# Patient Record
Sex: FEMALE | Race: WHITE | URBAN - METROPOLITAN AREA
[De-identification: names, ages, dates, MRNs, and addresses within clinical notes are randomized per-mention and may not be internally consistent; named-entity substitution may affect disease eponyms.]

---

## 2024-01-03 ENCOUNTER — APPOINTMENT (OUTPATIENT)
Dept: GENERAL RADIOLOGY | Age: 44
End: 2024-01-03

## 2024-01-03 ENCOUNTER — APPOINTMENT (OUTPATIENT)
Dept: CT IMAGING | Age: 44
End: 2024-01-03

## 2024-01-03 ENCOUNTER — HOSPITAL ENCOUNTER (EMERGENCY)
Age: 44
Discharge: HOME | End: 2024-01-03
Attending: EMERGENCY MEDICINE

## 2024-01-03 VITALS
TEMPERATURE: 98 F | SYSTOLIC BLOOD PRESSURE: 142 MMHG | RESPIRATION RATE: 18 BRPM | OXYGEN SATURATION: 99 % | DIASTOLIC BLOOD PRESSURE: 70 MMHG | HEART RATE: 79 BPM

## 2024-01-03 DIAGNOSIS — R42 DIZZINESS: Primary | ICD-10-CM

## 2024-01-03 LAB
ALBUMIN SERPL-MCNC: 4.4 G/DL (ref 3.4–5)
ALP SERPL-CCNC: 71 U/L (ref 40–129)
ALT SERPL-CCNC: 31 U/L (ref 10–40)
ANION GAP SERPL CALCULATED.3IONS-SCNC: 14 MMOL/L (ref 3–16)
AST SERPL-CCNC: 24 U/L (ref 15–37)
BACTERIA URNS QL MICRO: ABNORMAL /HPF
BASOPHILS # BLD: 0.1 K/UL (ref 0–0.2)
BASOPHILS NFR BLD: 1.3 %
BILIRUB DIRECT SERPL-MCNC: <0.2 MG/DL (ref 0–0.3)
BILIRUB INDIRECT SERPL-MCNC: NORMAL MG/DL (ref 0–1)
BILIRUB SERPL-MCNC: 0.3 MG/DL (ref 0–1)
BILIRUB UR QL STRIP.AUTO: NEGATIVE
BUN SERPL-MCNC: 10 MG/DL (ref 7–20)
CALCIUM SERPL-MCNC: 9.7 MG/DL (ref 8.3–10.6)
CHLORIDE SERPL-SCNC: 106 MMOL/L (ref 99–110)
CLARITY UR: CLEAR
CO2 SERPL-SCNC: 20 MMOL/L (ref 21–32)
COLOR UR: YELLOW
CREAT SERPL-MCNC: 0.8 MG/DL (ref 0.6–1.1)
DEPRECATED RDW RBC AUTO: 13.7 % (ref 12.4–15.4)
EKG ATRIAL RATE: 82 BPM
EKG DIAGNOSIS: NORMAL
EKG P AXIS: 31 DEGREES
EKG P-R INTERVAL: 154 MS
EKG Q-T INTERVAL: 388 MS
EKG QRS DURATION: 86 MS
EKG QTC CALCULATION (BAZETT): 453 MS
EKG R AXIS: 24 DEGREES
EKG T AXIS: 4 DEGREES
EKG VENTRICULAR RATE: 82 BPM
EOSINOPHIL # BLD: 0.2 K/UL (ref 0–0.6)
EOSINOPHIL NFR BLD: 3.7 %
EPI CELLS #/AREA URNS HPF: ABNORMAL /HPF (ref 0–5)
FLUAV RNA RESP QL NAA+PROBE: NOT DETECTED
FLUBV RNA RESP QL NAA+PROBE: NOT DETECTED
GFR SERPLBLD CREATININE-BSD FMLA CKD-EPI: >60 ML/MIN/{1.73_M2}
GLUCOSE SERPL-MCNC: 101 MG/DL (ref 70–99)
GLUCOSE UR STRIP.AUTO-MCNC: NEGATIVE MG/DL
HCG UR QL: NEGATIVE
HCT VFR BLD AUTO: 37.8 % (ref 36–48)
HGB BLD-MCNC: 12.7 G/DL (ref 12–16)
HGB UR QL STRIP.AUTO: ABNORMAL
HYALINE CASTS #/AREA URNS LPF: ABNORMAL /LPF (ref 0–2)
KETONES UR STRIP.AUTO-MCNC: NEGATIVE MG/DL
LEUKOCYTE ESTERASE UR QL STRIP.AUTO: NEGATIVE
LIPASE SERPL-CCNC: 43 U/L (ref 13–60)
LYMPHOCYTES # BLD: 2.1 K/UL (ref 1–5.1)
LYMPHOCYTES NFR BLD: 31.1 %
MCH RBC QN AUTO: 28.3 PG (ref 26–34)
MCHC RBC AUTO-ENTMCNC: 33.7 G/DL (ref 31–36)
MCV RBC AUTO: 84.1 FL (ref 80–100)
MONOCYTES # BLD: 0.4 K/UL (ref 0–1.3)
MONOCYTES NFR BLD: 5.6 %
MUCOUS THREADS #/AREA URNS LPF: ABNORMAL /LPF
NEUTROPHILS # BLD: 4 K/UL (ref 1.7–7.7)
NEUTROPHILS NFR BLD: 58.3 %
NITRITE UR QL STRIP.AUTO: NEGATIVE
NT-PROBNP SERPL-MCNC: 116 PG/ML (ref 0–124)
PH UR STRIP.AUTO: 6 [PH] (ref 5–8)
PLATELET # BLD AUTO: 348 K/UL (ref 135–450)
PMV BLD AUTO: 7.4 FL (ref 5–10.5)
POTASSIUM SERPL-SCNC: 4 MMOL/L (ref 3.5–5.1)
PROT SERPL-MCNC: 7.6 G/DL (ref 6.4–8.2)
PROT UR STRIP.AUTO-MCNC: NEGATIVE MG/DL
RBC # BLD AUTO: 4.5 M/UL (ref 4–5.2)
RBC #/AREA URNS HPF: ABNORMAL /HPF (ref 0–4)
SARS-COV-2 RNA RESP QL NAA+PROBE: NOT DETECTED
SODIUM SERPL-SCNC: 140 MMOL/L (ref 136–145)
SP GR UR STRIP.AUTO: 1.02 (ref 1–1.03)
TROPONIN, HIGH SENSITIVITY: <6 NG/L (ref 0–14)
UA COMPLETE W REFLEX CULTURE PNL UR: ABNORMAL
UA DIPSTICK W REFLEX MICRO PNL UR: YES
URN SPEC COLLECT METH UR: ABNORMAL
UROBILINOGEN UR STRIP-ACNC: 0.2 E.U./DL
WBC # BLD AUTO: 6.8 K/UL (ref 4–11)
WBC #/AREA URNS HPF: ABNORMAL /HPF (ref 0–5)

## 2024-01-03 PROCEDURE — 83690 ASSAY OF LIPASE: CPT

## 2024-01-03 PROCEDURE — 99285 EMERGENCY DEPT VISIT HI MDM: CPT

## 2024-01-03 PROCEDURE — 96374 THER/PROPH/DIAG INJ IV PUSH: CPT

## 2024-01-03 PROCEDURE — 80076 HEPATIC FUNCTION PANEL: CPT

## 2024-01-03 PROCEDURE — 2580000003 HC RX 258

## 2024-01-03 PROCEDURE — 83880 ASSAY OF NATRIURETIC PEPTIDE: CPT

## 2024-01-03 PROCEDURE — 85025 COMPLETE CBC W/AUTO DIFF WBC: CPT

## 2024-01-03 PROCEDURE — 71046 X-RAY EXAM CHEST 2 VIEWS: CPT

## 2024-01-03 PROCEDURE — 6360000002 HC RX W HCPCS

## 2024-01-03 PROCEDURE — 93005 ELECTROCARDIOGRAM TRACING: CPT

## 2024-01-03 PROCEDURE — 70450 CT HEAD/BRAIN W/O DYE: CPT

## 2024-01-03 PROCEDURE — 84703 CHORIONIC GONADOTROPIN ASSAY: CPT

## 2024-01-03 PROCEDURE — 87636 SARSCOV2 & INF A&B AMP PRB: CPT

## 2024-01-03 PROCEDURE — 80048 BASIC METABOLIC PNL TOTAL CA: CPT

## 2024-01-03 PROCEDURE — 84484 ASSAY OF TROPONIN QUANT: CPT

## 2024-01-03 PROCEDURE — 81001 URINALYSIS AUTO W/SCOPE: CPT

## 2024-01-03 RX ORDER — SODIUM CHLORIDE, SODIUM LACTATE, POTASSIUM CHLORIDE, AND CALCIUM CHLORIDE .6; .31; .03; .02 G/100ML; G/100ML; G/100ML; G/100ML
1000 INJECTION, SOLUTION INTRAVENOUS ONCE
Status: COMPLETED | OUTPATIENT
Start: 2024-01-03 | End: 2024-01-03

## 2024-01-03 RX ORDER — ONDANSETRON 4 MG/1
4 TABLET, ORALLY DISINTEGRATING ORAL 3 TIMES DAILY PRN
Qty: 21 TABLET | Refills: 0 | Status: SHIPPED | OUTPATIENT
Start: 2024-01-03

## 2024-01-03 RX ORDER — ONDANSETRON 2 MG/ML
4 INJECTION INTRAMUSCULAR; INTRAVENOUS EVERY 6 HOURS PRN
Status: DISCONTINUED | OUTPATIENT
Start: 2024-01-03 | End: 2024-01-03 | Stop reason: HOSPADM

## 2024-01-03 RX ADMIN — ONDANSETRON 4 MG: 2 INJECTION INTRAMUSCULAR; INTRAVENOUS at 18:00

## 2024-01-03 RX ADMIN — SODIUM CHLORIDE, POTASSIUM CHLORIDE, SODIUM LACTATE AND CALCIUM CHLORIDE 1000 ML: 600; 310; 30; 20 INJECTION, SOLUTION INTRAVENOUS at 18:03

## 2024-01-03 ASSESSMENT — ENCOUNTER SYMPTOMS
COUGH: 0
EYE PAIN: 0
DIARRHEA: 0
ABDOMINAL PAIN: 0
ALLERGIC/IMMUNOLOGIC NEGATIVE: 1
NAUSEA: 0
CONSTIPATION: 0
VOMITING: 0
WHEEZING: 0
PHOTOPHOBIA: 0
SHORTNESS OF BREATH: 0
CHEST TIGHTNESS: 0
ABDOMINAL DISTENTION: 0
BACK PAIN: 0

## 2024-01-03 ASSESSMENT — PAIN DESCRIPTION - DESCRIPTORS: DESCRIPTORS: DISCOMFORT

## 2024-01-03 ASSESSMENT — PAIN - FUNCTIONAL ASSESSMENT: PAIN_FUNCTIONAL_ASSESSMENT: 0-10

## 2024-01-03 ASSESSMENT — PAIN DESCRIPTION - LOCATION: LOCATION: HEAD

## 2024-01-03 ASSESSMENT — PAIN SCALES - GENERAL: PAINLEVEL_OUTOF10: 2

## 2024-01-03 NOTE — ED NOTES
Signed       Expand All Collapse [facility] Emergency Department  MEDICAL SCREENING EXAM     Date of Service: 1/3/2024     Reason for Visit: No chief complaint on file.           Patient History, Brief Exam, and Initial Assessment      Abbreviated HPI: Regine Schultz is a 43 y.o. female presenting with numbness and tingling to bilateral hands and feet as well as feeling lightheaded and dizzy that is been intermittent over the last 2 days.  Patient states she has had some intermittent left-sided upper abdominal pain and lower chest pain.  She denies any exertional pain.  Denies any shortness of breath.  Denies any productive cough or hemoptysis.  Denies any fevers.  Denies any slurred speech or facial droop.     INITIAL VITALS:  ,  ,  ,  ,       Patient is ambulatory in the emergency department that difficulty.  Alert and oriented   HEENT normocephalic atraumatic , no facial swelling, extraocular movements intact, no visual loss  Neck is supple  Lungs are clear to auscultation  Heart regular rate and rhythm  Abdomen positive bowel sounds all 4 quadrants soft  Extremities moving all extremities without edema or erythema  Neurological patient is alert and oriented, no acute focal deficits, no slurred speech or facial droop.  No pronator drift.  Moving all extremities.  No visual loss.  Stroke scale 0  5/5 strength of all extremities.     Plan      Patient was evaluated in the REU for a medical screening exam.  To further evaluate the presenting complaints, the following orders have been placed:      Orders Placed This Encounter   Procedures    COVID-19 & Influenza Combo    XR CHEST PORTABLE    CBC with Auto Differential    BMP w/ Reflex to MG    Hepatic Function Panel    Lipase    Troponin    BNP    Urine Preg (Lab)    Urinalysis with Reflex to Culture         See primary provider's note for full details and final disposition.      Current Facility-Administered Medications:     Encounter Medications 
primary provider's note for full details and final disposition.      Current Facility-Administered Medications:     Encounter Medications   No orders of the defined types were placed in this encounter.            REU Dispo      Stable for lobby while awaiting ED bed     Relevant Medical History      Past Medical History   No past medical history on file.     Past Surgical History   No past surgical history on file.     Not on File       Chela Wlide PA  01/03/24 1509                 Reason for Visit: No chief complaint on file.           Patient History, Brief Exam, and Initial Assessment      Abbreviated HPI: Regine Schultz is a 43 y.o. female presenting with numbness and tingling to bilateral hands and feet as well as feeling lightheaded and dizzy that is been intermittent over the last 2 days.  Patient states she has had some intermittent left-sided upper abdominal pain and lower chest pain.  She denies any exertional pain.  Denies any shortness of breath.  Denies any productive cough or hemoptysis.  Denies any fevers.  Denies any slurred speech or facial droop.     INITIAL VITALS:  ,  ,  ,  ,       Patient is ambulatory in the emergency department that difficulty.  Alert and oriented   HEENT normocephalic atraumatic , no facial swelling, extraocular movements intact, no visual loss  Neck is supple  Lungs are clear to auscultation  Heart regular rate and rhythm  Abdomen positive bowel sounds all 4 quadrants soft  Extremities moving all extremities without edema or erythema  Neurological patient is alert and oriented, no acute focal deficits, no slurred speech or facial droop.  No pronator drift.  Moving all extremities.  No visual loss.  Stroke scale 0  5/5 strength of all extremities.     Plan      Patient was evaluated in the REU for a medical screening exam.  To further evaluate the presenting complaints, the following orders have been placed:      Orders Placed This Encounter   Procedures    COVID-19 &

## 2024-01-03 NOTE — ED PROVIDER NOTES
ED Attending Attestation Note     Date of evaluation: 1/3/2024    This patient was seen by the advance practice provider.  I have seen and examined the patient, agree with the workup, evaluation, management and diagnosis. The care plan has been discussed.  I have reviewed the ECG and concur with the CALEB's interpretation.  My assessment reveals patient presents emergency department with numbness and tingling in her hands and feet as we will as feeling lightheaded and dizzy going on for 2 days now.  She is from out of town visiting family.  No other symptoms.  On my exam GCS 15.  No focal deficits.  NIH stroke scale performed which was 0 as documented below    NIH Stroke Scale  Interval: Baseline  Level of Consciousness (1a): Alert  LOC Questions (1b): Answers both correctly  LOC Commands (1c): Performs both tasks correctly  Best Gaze (2): Normal  Visual (3): No visual loss  Facial Palsy (4): Normal symmetrical movement  Motor Arm, Left (5a): No drift  Motor Arm, Right (5b): No drift  Motor Leg, Left (6a): No drift  Motor Leg, Right (6b): No drift  Limb Ataxia (7): Absent  Sensory (8): Normal  Best Language (9): No aphasia  Dysarthria (10): Normal  Extinction and Inattention (11): No abnormality  Total: 0     labs were ordered by medical screening exam CALEB.  And all were negative.  CT head was ordered.  Symptoms greater than 48 hours.  She was seen by urgent care who recommended she be seen for a CT scan.     Joshua Linder MD  01/03/24 0043    
[facility] Emergency Department  MEDICAL SCREENING EXAM    Date of Service: 1/3/2024    Reason for Visit: No chief complaint on file.        Patient History, Brief Exam, and Initial Assessment     Abbreviated HPI: Regine Schultz is a 43 y.o. female presenting with numbness and tingling to bilateral hands and feet as well as feeling lightheaded and dizzy that is been intermittent over the last 2 days.  Patient states she has had some intermittent left-sided upper abdominal pain and lower chest pain.  She denies any exertional pain.  Denies any shortness of breath.  Denies any productive cough or hemoptysis.  Denies any fevers.  Denies any slurred speech or facial droop.    INITIAL VITALS:  ,  ,  ,  ,      Patient is ambulatory in the emergency department that difficulty.  Alert and oriented   HEENT normocephalic atraumatic , no facial swelling, extraocular movements intact, no visual loss  Neck is supple  Lungs are clear to auscultation  Heart regular rate and rhythm  Abdomen positive bowel sounds all 4 quadrants soft  Extremities moving all extremities without edema or erythema  Neurological patient is alert and oriented, no acute focal deficits, no slurred speech or facial droop.  No pronator drift.  Moving all extremities.  No visual loss.  Stroke scale 0  5/5 strength of all extremities.    Plan     Patient was evaluated in the REU for a medical screening exam.  To further evaluate the presenting complaints, the following orders have been placed:  Orders Placed This Encounter   Procedures    COVID-19 & Influenza Combo    XR CHEST PORTABLE    CBC with Auto Differential    BMP w/ Reflex to MG    Hepatic Function Panel    Lipase    Troponin    BNP    Urine Preg (Lab)    Urinalysis with Reflex to Culture        See primary provider's note for full details and final disposition.     Current Facility-Administered Medications:   No orders of the defined types were placed in this encounter.        REU Dispo 
reviewed.         The patient was given the following medications:  Orders Placed This Encounter   Medications    ondansetron (ZOFRAN) injection 4 mg    lactated ringers bolus bolus 1,000 mL    ondansetron (ZOFRAN-ODT) 4 MG disintegrating tablet     Sig: Take 1 tablet by mouth 3 times daily as needed for Nausea or Vomiting     Dispense:  21 tablet     Refill:  0       CONSULTS:  None    Review of Systems     Review of Systems   Constitutional: Negative.  Negative for chills and fatigue.   HENT: Negative.     Eyes:  Negative for photophobia and pain.   Respiratory:  Negative for cough, chest tightness, shortness of breath and wheezing.    Cardiovascular:  Negative for chest pain, palpitations and leg swelling.   Gastrointestinal:  Negative for abdominal distention, abdominal pain, constipation, diarrhea, nausea and vomiting.   Endocrine: Negative.    Genitourinary: Negative.  Negative for difficulty urinating and flank pain.   Musculoskeletal: Negative.  Negative for back pain, joint swelling, myalgias and neck stiffness.   Skin:  Negative for rash and wound.   Allergic/Immunologic: Negative.    Neurological: Negative.  Negative for weakness, numbness and headaches.   Hematological: Negative.    Psychiatric/Behavioral:  Negative for agitation. The patient is not nervous/anxious.        Past Medical, Surgical, Family, and Social History     She has no past medical history on file.  She has no past surgical history on file.  Her family history is not on file.  She reports that she has never smoked. She has never used smokeless tobacco. She reports that she does not currently use alcohol. She reports that she does not use drugs.    Medications     Previous Medications    No medications on file       Allergies     She is allergic to gluten.    Physical Exam     INITIAL VITALS: BP: (!) 142/70, Temp: 98 °F (36.7 °C), Pulse: 79, Respirations: 18, SpO2: 99 %   Physical Exam  Constitutional:       General: She is not in acute

## 2024-01-04 NOTE — DISCHARGE INSTRUCTIONS
Return to the emergency department if you develop new symptoms including new or worsening pain, fevers, chest pain, cough, trouble breathing, nausea or vomiting, new abdominal pain, redness, numbness or tingling in any other symptoms concerning you.  Please follow-up as directed in your discharge paperwork call your PCP to schedule follow-up appointment.